# Patient Record
Sex: MALE | Race: BLACK OR AFRICAN AMERICAN | NOT HISPANIC OR LATINO | URBAN - METROPOLITAN AREA
[De-identification: names, ages, dates, MRNs, and addresses within clinical notes are randomized per-mention and may not be internally consistent; named-entity substitution may affect disease eponyms.]

---

## 2021-01-10 ENCOUNTER — OUTPATIENT (OUTPATIENT)
Dept: OUTPATIENT SERVICES | Facility: HOSPITAL | Age: 26
LOS: 1 days | End: 2021-01-10
Payer: COMMERCIAL

## 2021-01-10 LAB — SARS-COV-2 RNA SPEC QL NAA+PROBE: SIGNIFICANT CHANGE UP

## 2021-01-10 PROCEDURE — U0003: CPT

## 2021-01-10 PROCEDURE — U0005: CPT

## 2022-10-03 ENCOUNTER — EMERGENCY (EMERGENCY)
Facility: HOSPITAL | Age: 27
LOS: 1 days | Discharge: HOME | End: 2022-10-03
Attending: STUDENT IN AN ORGANIZED HEALTH CARE EDUCATION/TRAINING PROGRAM | Admitting: STUDENT IN AN ORGANIZED HEALTH CARE EDUCATION/TRAINING PROGRAM
Payer: MEDICAID

## 2022-10-03 VITALS
HEART RATE: 103 BPM | TEMPERATURE: 98 F | RESPIRATION RATE: 18 BRPM | DIASTOLIC BLOOD PRESSURE: 67 MMHG | SYSTOLIC BLOOD PRESSURE: 114 MMHG | OXYGEN SATURATION: 98 %

## 2022-10-03 PROCEDURE — 99283 EMERGENCY DEPT VISIT LOW MDM: CPT

## 2022-10-03 RX ORDER — IBUPROFEN 200 MG
600 TABLET ORAL ONCE
Refills: 0 | Status: COMPLETED | OUTPATIENT
Start: 2022-10-03 | End: 2022-10-03

## 2022-10-03 RX ORDER — CETIRIZINE HYDROCHLORIDE 10 MG/1
1 TABLET ORAL
Qty: 14 | Refills: 0
Start: 2022-10-03 | End: 2022-10-16

## 2022-10-03 RX ORDER — IBUPROFEN 200 MG
1 TABLET ORAL
Qty: 28 | Refills: 0
Start: 2022-10-03 | End: 2022-10-09

## 2022-10-03 NOTE — ED ADULT TRIAGE NOTE - PAIN: PRESENCE, MLM
Note Text (......Xxx Chief Complaint.): This diagnosis correlates with the Detail Level: Simple Other (Free Text): Unsure what sulfa and PCN reactions were. complains of pain/discomfort

## 2022-10-03 NOTE — ED PROVIDER NOTE - CLINICAL SUMMARY MEDICAL DECISION MAKING FREE TEXT BOX
clnical appearance of impacted wisdom tooth, no signs of abscess or other infectious pathology. will dc with zrtec for allergies, analgesia, dental follow up and pmd referral (patient requesting pmd)

## 2022-10-03 NOTE — ED PROVIDER NOTE - NSFOLLOWUPCLINICS_GEN_ALL_ED_FT
[facility]  Family Medicine  [address]  [place], [place] [address]  Phone: [phone]  Fax:   Follow Up Time: Routine

## 2022-10-03 NOTE — ED PROVIDER NOTE - OBJECTIVE STATEMENT
26m no pmhx presenting for several days of left lower dental pain. no trauma. no fevers. also experincing his typical seasonal allergies with the change of weather. experincing runny nose and eye itching.

## 2022-10-03 NOTE — ED PROVIDER NOTE - PATIENT PORTAL LINK FT
You can access the FollowMyHealth Patient Portal offered by [facility] by registering at the following website: [email or web address]. By joining [employer]’s FollowMyHealth portal, you will also be able to view your health information using other applications (apps) compatible with our system.

## 2022-10-03 NOTE — ED PROVIDER NOTE - PHYSICAL EXAMINATION
General: Awake, alert and oriented. No acute distress. Well developed, hydrated and nourished. Appears stated age.   Skin: Skin in warm, dry and intact without rashes or lesions. Appropriate color for ethnicity  HENMT: head normocephalic and atraumatic; bilateral external ears without swelling. no nasal discharge. moist oral mucosa. supple neck, trachea midline. no intraoral lesions or swelling. impacted left lower wisdom tooth. no fluctuance, swelling or ttp.   EYES: Conjunctiva clear. nonicteric sclera. EOM intact, Eyelids are normal in appearance without swelling or lesions.  Cardiac: well perfused  Respiratory: breathing comfortably on room air. no audible wheezing or stridor  Abdominal: nondistended  MSK: Neck and back are without deformity, visible external skin changes, or signs of trauma. Curvature of the cervical, thoracic, and lumbar spine are within normal limits. no external signs of trauma. no apparent deficits in ROM of any extremity  Neurological: The patient is awake, alert and oriented to person, place, and time with normal speech. CN 2-12 grossly intact. no apparent deficits. Memory is normal and thought process is intact. No gait abnormalities are appreciated.   Psychiatric: Appropriate mood and affect. Good judgement and insight. No visual or auditory hallucinations.

## 2022-10-04 PROCEDURE — 99283 EMERGENCY DEPT VISIT LOW MDM: CPT

## 2022-10-04 RX ADMIN — Medication 600 MILLIGRAM(S): at 00:04

## 2022-10-04 NOTE — ED ADULT NURSE NOTE - OBJECTIVE STATEMENT
pt. a&ox4 ambulatory with steady gait comes to ED c/o left sided toothache. Pt. reports he has not had his wisdom teeth out, has aching sensation that has become worse and more constant over last few days, denies difficulty swallowing, f/c, difficulty eating or tolerating PO intake. pt. denies swelling within the mouth, swallowing of blood or drainage. Pt. airway patent, breathing spontaneous and unlabored, speaking in clear coherent sentences. No abscess seen on assessment or open wound. Pt. denies HA, neck tenderness. Pt. provided pain mgmt as per MD orders and referred to dental clinic to call and make appt. pt. had not seen a dental specialist of any kind prior to ED visit.

## 2022-10-04 NOTE — ED ADULT NURSE NOTE - NSIMPLEMENTINTERV_GEN_ALL_ED
Implemented All Universal Safety Interventions:  [place] to call system. Call bell, personal items and telephone within reach. Instruct patient to call for assistance. Room bathroom lighting operational. Non-slip footwear when patient is off stretcher. Physically safe environment: no spills, clutter or unnecessary equipment. Stretcher in lowest position, wheels locked, appropriate side rails in place.

## 2022-10-06 DIAGNOSIS — K08.89 OTHER SPECIFIED DISORDERS OF TEETH AND SUPPORTING STRUCTURES: ICD-10-CM

## 2022-10-06 DIAGNOSIS — J30.2 OTHER SEASONAL ALLERGIC RHINITIS: ICD-10-CM

## 2023-10-21 ENCOUNTER — EMERGENCY (EMERGENCY)
Facility: HOSPITAL | Age: 28
LOS: 1 days | Discharge: HOME | End: 2023-10-21
Admitting: EMERGENCY MEDICINE
Payer: MEDICAID

## 2023-10-21 VITALS
WEIGHT: 175.05 LBS | HEIGHT: 69 IN | DIASTOLIC BLOOD PRESSURE: 61 MMHG | RESPIRATION RATE: 18 BRPM | HEART RATE: 92 BPM | SYSTOLIC BLOOD PRESSURE: 110 MMHG | TEMPERATURE: 99 F | OXYGEN SATURATION: 98 %

## 2023-10-21 DIAGNOSIS — F17.200 NICOTINE DEPENDENCE, UNSPECIFIED, UNCOMPLICATED: ICD-10-CM

## 2023-10-21 DIAGNOSIS — M54.2 CERVICALGIA: ICD-10-CM

## 2023-10-21 DIAGNOSIS — K02.9 DENTAL CARIES, UNSPECIFIED: ICD-10-CM

## 2023-10-21 PROCEDURE — 99284 EMERGENCY DEPT VISIT MOD MDM: CPT

## 2023-10-21 PROCEDURE — 96372 THER/PROPH/DIAG INJ SC/IM: CPT

## 2023-10-21 PROCEDURE — 99283 EMERGENCY DEPT VISIT LOW MDM: CPT | Mod: 25

## 2023-10-21 RX ORDER — KETOROLAC TROMETHAMINE 30 MG/ML
15 SYRINGE (ML) INJECTION ONCE
Refills: 0 | Status: DISCONTINUED | OUTPATIENT
Start: 2023-10-21 | End: 2023-10-21

## 2023-10-21 RX ADMIN — Medication 15 MILLIGRAM(S): at 04:04

## 2023-10-21 NOTE — ED PROVIDER NOTE - NOSE [-], MLM
no nasal congestion Cheiloplasty (Complex) Text: A decision was made to reconstruct the defect with a  cheiloplasty.  The defect was undermined extensively.  Additional obicularis oris muscle was excised with a 15 blade scalpel.  The defect was converted into a full thickness wedge to facilite a better cosmetic result.  Small vessels were then tied off with 5-0 monocyrl. The obicularis oris, superficial fascia, adipose and dermis were then reapproximated.  After the deeper layers were approximated the epidermis was reapproximated with particular care given to realign the vermilion border.

## 2023-10-21 NOTE — ED PROVIDER NOTE - PHYSICAL EXAMINATION
Constitutional: awake and alert, in no acute distress  HEENT: head normocephalic and atraumatic. moist mucous membranes, poor dentition with carries, gingival erythema and edema,   Eyes: extraocular movements intact, normal conjunctiva  Neck: supple, normal ROM, no tenderness to cervical spine, no left side neck mass, no enlarged lymph nodes,   Cardiovascular: regular rate   Pulmonary: no respiratory distress  Gastrointestinal: abdomen flat and nondistended  Skin: warm, dry, normal for ethnicity  Musculoskeletal: no edema, no deformity, NROM  Neurological: oriented x4, no focal neurologic deficit.   Psychiatric: calm and cooperative, no SI/HI

## 2023-10-21 NOTE — ED PROVIDER NOTE - NSFOLLOWUPINSTRUCTIONS_ED_ALL_ED_FT
Please take medications as prescribed and follow up with your dentist for further evaluation next week. You may follow up at   [facility] is located at [facility] [address] [facility] (between [address] in [place]. Patient appointment hours are Monday – Friday, 8:00 am – 6:00 pm tel# [phone].      Dental Abscess    AMBULATORY CARE:    A dental abscess is a collection of pus in or around a tooth. A dental abscess is caused by bacteria. The bacteria can enter the tooth when the enamel (outer part of the tooth) is damaged by tooth decay. Bacteria can also enter the tooth through a chip in the tooth or a cut in the gum. Food particles that are stuck between the teeth for a long time may also lead to an abscess.  Dental Abscess    Common signs and symptoms:    Toothache, a loose tooth, or a tooth that is very sensitive to pressure or temperature    Bad breath, unpleasant taste, and drooling    Fever    Pain, redness, and swelling of the gums, or swelling of your face and neck    Pain when you open or close your mouth    Trouble opening your mouth  Seek care immediately if:    You have severe pain in your tooth or jaw.    You have trouble breathing because of pain or swelling.  Call your doctor if:    Your symptoms get worse, even after treatment.    Your mouth is bleeding.    You cannot eat or drink because of pain or swelling.    Your abscess returns.    You have an injury that causes a crack in your tooth.    You have questions or concerns about your condition or care.  Treatment: You may need any of the following:    Medicines may be given to treat a bacterial infection and decrease pain.    Incision and drainage is a cut in the abscess to allow the pus to drain. A sample of fluid may be collected from your abscess. The fluid is sent to a lab and tested for bacteria. Ask your healthcare provider for more information.    A root canal is a procedure to remove the bacteria and prevent more infection. It is usually done after an incision and drainage. A filling or crown will be placed over the tooth after you have healed from your root canal.    Tooth removal may be needed if the infection affects deeper tissues. This is usually done after an incision and drainage.  Self-care:    Rinse your mouth every 2 hours with salt water. This will help keep the area clean.    Gently brush your teeth twice a day with a soft tooth brush. This will help keep the area clean.    Eat soft foods as directed. Soft foods may cause less pain. Examples include applesauce, yogurt, and cooked pasta. Ask your healthcare provider how long to follow this instruction.    Apply a warm compress to your tooth or gum. Use a cotton ball or gauze soaked in warm water. Remove the compress in 10 minutes or when it becomes cool. Repeat 3 times a day.  Prevent another abscess:    Brush your teeth at least 2 times a day with fluoride toothpaste.    Use dental floss at least once a day to clean between your teeth.    Rinse your mouth with water or mouthwash after meals and snacks. Chew sugarless gum.    Avoid sugary and starchy food that can stick between your teeth. Limit drinks high in sugar, such as soda or fruit juice.    See your dentist every 6 months for dental cleanings and oral exams.  Follow up with your doctor or dentist as directed: Your healthcare provider will need to check your teeth and gums. Write down your questions so you remember to ask them during your visits.

## 2023-10-21 NOTE — ED ADULT NURSE NOTE - NSFALLUNIVINTERV_ED_ALL_ED
Bed/Stretcher in lowest position, wheels locked, appropriate side rails in place/Call bell, personal items and telephone in reach/Instruct patient to call for assistance before getting out of bed/chair/stretcher/Non-slip footwear applied when patient is off stretcher/[place] to call system/Physically safe environment - no spills, clutter or unnecessary equipment/Purposeful proactive rounding/Room/bathroom lighting operational, light cord in reach

## 2023-10-21 NOTE — ED ADULT NURSE NOTE - OBJECTIVE STATEMENT
Patient arrived to the ER c.o neck pain rt side radiating from the jaw as per pt. Pt denies any other medical complaints at this moment. Pt is noted to be aox3, able to maintain airway, having nonlabored breathing, no retractions noted, non diaphoretic and able to talk in clear full sentences.

## 2023-10-21 NOTE — ED PROVIDER NOTE - OBJECTIVE STATEMENT
26 yo male in the ER c/o pain to his left side neck, left cheek and left jaw x 3 days. Pt denies any injury to his face or neck, denies any numbness to his left arm, denies fever, chills, HA, CP, SOB.

## 2023-10-21 NOTE — ED ADULT TRIAGE NOTE - PAIN RATING/NUMBER SCALE (0-10): ACTIVITY
5 (moderate pain) Picato Counseling:  I discussed with the patient the risks of Picato including but not limited to erythema, scaling, itching, weeping, crusting, and pain.

## 2023-10-21 NOTE — ED PROVIDER NOTE - PATIENT PORTAL LINK FT
You can access the FollowMyHealth Patient Portal offered by [facility] by registering at the following website: [email or web address]. By joining [employer]’s FollowMyHealth portal, you will also be able to view your health information using other applications (apps) compatible with our system.